# Patient Record
Sex: FEMALE | URBAN - METROPOLITAN AREA
[De-identification: names, ages, dates, MRNs, and addresses within clinical notes are randomized per-mention and may not be internally consistent; named-entity substitution may affect disease eponyms.]

---

## 2017-05-02 ENCOUNTER — APPOINTMENT (RX ONLY)
Dept: URBAN - METROPOLITAN AREA CLINIC 141 | Facility: CLINIC | Age: 57
Setting detail: DERMATOLOGY
End: 2017-05-02

## 2017-05-02 DIAGNOSIS — Z41.9 ENCOUNTER FOR PROCEDURE FOR PURPOSES OTHER THAN REMEDYING HEALTH STATE, UNSPECIFIED: ICD-10-CM

## 2017-05-02 PROCEDURE — ? WAXING

## 2017-05-02 PROCEDURE — ? LASER HAIR REMOVAL

## 2017-05-02 ASSESSMENT — LOCATION SIMPLE DESCRIPTION DERM
LOCATION SIMPLE: RIGHT EYEBROW
LOCATION SIMPLE: CHIN
LOCATION SIMPLE: GLABELLA
LOCATION SIMPLE: UPPER LIP
LOCATION SIMPLE: LEFT EYEBROW

## 2017-05-02 ASSESSMENT — LOCATION DETAILED DESCRIPTION DERM
LOCATION DETAILED: RIGHT MENTAL CREASE
LOCATION DETAILED: PHILTRUM
LOCATION DETAILED: RIGHT CENTRAL EYEBROW
LOCATION DETAILED: LEFT LATERAL EYEBROW
LOCATION DETAILED: GLABELLA

## 2017-05-02 ASSESSMENT — LOCATION ZONE DERM
LOCATION ZONE: LIP
LOCATION ZONE: FACE

## 2017-05-02 NOTE — PROCEDURE: WAXING
Price (Use Numbers Only, No Special Characters Or $): 15
Techique: Baby powder applied. The unwanted hair in the treatment area(s) were removed using Wax. Applied Locoid Cream post tx
Detail Level: Detailed
Post-Care Instructions: I reviewed with the patient in detail post-care instructions. Patient should avoid sun exposure and wear sun protection.

## 2017-05-02 NOTE — PROCEDURE: LASER HAIR REMOVAL
Fluence (Will Not Render If 0): 12
Total Pulses (Settings #3): 2 pass, 1hz
Pulse Duration: 40 ms
Spot Size: 18 mm
Post-Procedure Care: Immediate endpoint: perifollicular erythema was noted. Applied hydrocortisone cream and spf30+. Post care reviewed with patient.
Number Of Prepaid Treatments (Will Not Render If 0): 0
Fluence (Will Not Render If 0): 10
Treatment Number: 3
Detail Level: Zone
Render Post-Care In The Note: No
Laser Type: diode 810nm
Pre-Procedure: Prior to proceeding the treatment areas were cleaned and all present put on their eye protection.
Anesthesia Type: sterile water
Pulse Duration: 35 ms
Consent: Written consent obtained, risks reviewed including but not limited to crusting, scabbing, blistering, scarring, darker or lighter pigmentary change, paradoxical hair regrowth, incomplete removal of hair and infection.
Post-Care Instructions: I reviewed with the patient in detail post-care instructions. Patient should avoid sun for a minimum of 4 weeks before and after treatment.
Cooling: chill tip
Pulse Duration: auto
Total Pulses: 5hz x 2 pass
Total Pulses (Settings #4): 10hz, 2 passes, FH
Total Pulses: 5hz with 3pass
Price (Use Numbers Only, No Special Characters Or $): 62.50

## 2017-05-17 ENCOUNTER — APPOINTMENT (RX ONLY)
Dept: URBAN - METROPOLITAN AREA CLINIC 141 | Facility: CLINIC | Age: 57
Setting detail: DERMATOLOGY
End: 2017-05-17

## 2017-05-17 DIAGNOSIS — Z41.9 ENCOUNTER FOR PROCEDURE FOR PURPOSES OTHER THAN REMEDYING HEALTH STATE, UNSPECIFIED: ICD-10-CM

## 2017-05-17 PROBLEM — Z85.828 PERSONAL HISTORY OF OTHER MALIGNANT NEOPLASM OF SKIN: Status: ACTIVE | Noted: 2017-05-17

## 2017-05-17 PROCEDURE — ? IPL COSMETIC

## 2017-05-17 ASSESSMENT — LOCATION DETAILED DESCRIPTION DERM
LOCATION DETAILED: LEFT SUPERIOR LATERAL NECK
LOCATION DETAILED: RIGHT SUPERIOR LATERAL NECK

## 2017-05-17 ASSESSMENT — LOCATION SIMPLE DESCRIPTION DERM
LOCATION SIMPLE: RIGHT ANTERIOR NECK
LOCATION SIMPLE: LEFT ANTERIOR NECK

## 2017-05-17 ASSESSMENT — LOCATION ZONE DERM: LOCATION ZONE: NECK

## 2017-05-17 NOTE — PROCEDURE: IPL COSMETIC
Ipl Type: Herson damien
Pre-Procedure Care: Prior to the procedure the patient and all present had protective eyewear in place and a warning sign was placed on the door. Skin cleansed with Hibicleanse and water.
Pulse Energy (Include Units): 27j/cm2
Price (Use Numbers Only, No Special Characters Or $): 150
Detail Level: Zone
Consent: Prior to the procedure consent obtained, risks reviewed including but not limited to crusting, scabbing, blistering, scarring, darker or lighter pigmentary change, incidental hair removal, bruising, and/or incomplete removal.
Pulse Duration (Include Units): Type 1 skin (15msec), green handpiece, 1 burst energy
Number Of Passes: 1 pass
Post-Care Instructions: I reviewed with the patient in detail post-care instructions. Patient should stay away from the sun and wear sun protection until treated areas are fully healed. Pt to refrain from AHA's and Retinol products for 72 hours.\\n\\nTalked to her about doing Yag handpiece on stubborn vessels next tx
Eye Protection: IPL Aid
Treatment Number: 1
End-Point And Post-Procedure Care: The procedure continued until mild purpura was noted.  Immediately following the procedure, hydrocortisone and SPF30+ was applied. Post care reviewed with patient.
Indication: Poikeloderma

## 2017-06-06 ENCOUNTER — APPOINTMENT (RX ONLY)
Dept: URBAN - METROPOLITAN AREA CLINIC 141 | Facility: CLINIC | Age: 57
Setting detail: DERMATOLOGY
End: 2017-06-06

## 2017-06-06 DIAGNOSIS — Z41.9 ENCOUNTER FOR PROCEDURE FOR PURPOSES OTHER THAN REMEDYING HEALTH STATE, UNSPECIFIED: ICD-10-CM

## 2017-06-06 PROCEDURE — ? WAXING

## 2017-06-06 PROCEDURE — ? IPL COSMETIC

## 2017-06-06 ASSESSMENT — LOCATION DETAILED DESCRIPTION DERM
LOCATION DETAILED: LEFT SUPERIOR LATERAL NECK
LOCATION DETAILED: GLABELLA
LOCATION DETAILED: RIGHT SUPERIOR LATERAL NECK

## 2017-06-06 ASSESSMENT — LOCATION SIMPLE DESCRIPTION DERM
LOCATION SIMPLE: GLABELLA
LOCATION SIMPLE: LEFT ANTERIOR NECK
LOCATION SIMPLE: RIGHT ANTERIOR NECK

## 2017-06-06 ASSESSMENT — LOCATION ZONE DERM
LOCATION ZONE: NECK
LOCATION ZONE: FACE

## 2017-06-06 NOTE — PROCEDURE: IPL COSMETIC
End-Point And Post-Procedure Care: The procedure continued until mild purpura was noted.  Immediately following the procedure, hydrocortisone and SPF30+ was applied. Post care reviewed with patient.
Treatment Number: 2
Indication: Telangiectasia
Pulse Duration (Include Units): Type 1 skin (1msec), green handpiece, 1 burst energy
Price (Use Numbers Only, No Special Characters Or $): 150
Number Of Passes: 1 pass
Consent: Prior to the procedure consent obtained, risks reviewed including but not limited to crusting, scabbing, blistering, scarring, darker or lighter pigmentary change, incidental hair removal, bruising, and/or incomplete removal.
Pre-Procedure Care: Prior to the procedure the patient and all present had protective eyewear in place and a warning sign was placed on the door. Skin cleansed with Hibicleanse and water.
Pulse Energy (Include Units): 27j/cm2
Post-Care Instructions: I reviewed with the patient in detail post-care instructions. Patient should stay away from the sun and wear sun protection until treated areas are fully healed. Pt to refrain from AHA's and Retinol products for 72 hours.\\n\\nTalked to her about doing Yag handpiece on stubborn vessels next tx
Eye Protection: IPL Aid
Detail Level: Zone
Ipl Type: Herson damien

## 2017-06-06 NOTE — PROCEDURE: WAXING
Price (Use Numbers Only, No Special Characters Or $): 15
Detail Level: Detailed
Post-Care Instructions: I reviewed with the patient in detail post-care instructions. Patient should avoid sun exposure and wear sun protection.
Techique: Baby powder applied. The unwanted hair in the treatment area(s) were removed using Wax. Applied Locoid Cream post tx

## 2018-05-22 ENCOUNTER — APPOINTMENT (RX ONLY)
Dept: URBAN - METROPOLITAN AREA CLINIC 141 | Facility: CLINIC | Age: 58
Setting detail: DERMATOLOGY
End: 2018-05-22

## 2018-05-22 DIAGNOSIS — Z41.9 ENCOUNTER FOR PROCEDURE FOR PURPOSES OTHER THAN REMEDYING HEALTH STATE, UNSPECIFIED: ICD-10-CM

## 2018-05-22 PROBLEM — Z85.828 PERSONAL HISTORY OF OTHER MALIGNANT NEOPLASM OF SKIN: Status: ACTIVE | Noted: 2018-05-22

## 2018-05-22 PROCEDURE — ? LASER HAIR REMOVAL

## 2018-05-22 ASSESSMENT — LOCATION DETAILED DESCRIPTION DERM
LOCATION DETAILED: PHILTRUM
LOCATION DETAILED: LEFT MENTAL CREASE

## 2018-05-22 ASSESSMENT — LOCATION SIMPLE DESCRIPTION DERM
LOCATION SIMPLE: UPPER LIP
LOCATION SIMPLE: CHIN

## 2018-05-22 ASSESSMENT — LOCATION ZONE DERM
LOCATION ZONE: LIP
LOCATION ZONE: FACE

## 2018-05-22 NOTE — PROCEDURE: LASER HAIR REMOVAL
Cooling: chill tip
Number Of Prepaid Treatments (Will Not Render If 0): 0
Spot Size: 18 mm
Detail Level: Zone
Pulse Duration: 35 ms
Total Pulses: 5hz x 4pass
Render Post-Care In The Note: No
Fluence (Will Not Render If 0): 12
Consent: Written consent obtained, risks reviewed including but not limited to crusting, scabbing, blistering, scarring, darker or lighter pigmentary change, paradoxical hair regrowth, incomplete removal of hair and infection.
Pulse Duration: 40 ms
Total Pulses (Settings #4): 10hz, 4passes
Laser Type: diode 810nm
Total Pulses: 5hz with 4pass
Post-Procedure Care: Immediate endpoint: perifollicular erythema was noted. Applied hydrocortisone cream and spf30+. Post care reviewed with patient.
Price (Use Numbers Only, No Special Characters Or $): 62.50
Treatment Number: 2
Post-Care Instructions: I reviewed with the patient in detail post-care instructions. Patient should avoid sun for a minimum of 4 weeks before and after treatment.
Pre-Procedure: Prior to proceeding the treatment areas were cleaned and all present put on their eye protection.
Anesthesia Type: sterile water
Total Pulses (Settings #3): 2pass, 10hz